# Patient Record
Sex: MALE | Race: WHITE | NOT HISPANIC OR LATINO | Employment: OTHER | URBAN - METROPOLITAN AREA
[De-identification: names, ages, dates, MRNs, and addresses within clinical notes are randomized per-mention and may not be internally consistent; named-entity substitution may affect disease eponyms.]

---

## 2018-06-20 DIAGNOSIS — H90.3 SENSORINEURAL HEARING LOSS, BILATERAL: Primary | ICD-10-CM

## 2018-06-28 ENCOUNTER — MEDICAL CORRESPONDENCE (OUTPATIENT)
Dept: HEALTH INFORMATION MANAGEMENT | Facility: CLINIC | Age: 83
End: 2018-06-28

## 2018-07-02 ENCOUNTER — OFFICE VISIT (OUTPATIENT)
Dept: AUDIOLOGY | Facility: CLINIC | Age: 83
End: 2018-07-02
Payer: COMMERCIAL

## 2018-07-02 DIAGNOSIS — H90.3 SENSORINEURAL HEARING LOSS, BILATERAL: ICD-10-CM

## 2018-07-02 NOTE — PROGRESS NOTES
AUDIOLOGY REPORT    BACKGROUND INFORMATION: Dr. Marnie Valentine implanted Mono Del Valle with a right Nucleus Freedom cochlear implant on 3/19/12 due to severe to profound hearing loss in the right ear and profound hearing loss in the left ear.  The loss is primarily sensorineural but may be mixed in at least one ear, likely due to otosclerosis.  The patient is being seen for discussion of cochlear implant upgrade on 7/2/18 in Audiology at the [facility] [place].  Today's appointment was ordered by Dr. Valentine.      TEST RESULTS AND PROCEDURES: The patient's N5 processors will be obsolete in 2019 so he would like to consider upgrades.  We discussed the Kanso versus the N7 and the patient would like to proceed with N7 upgrade.  The order form was completed.  He would like a sand processor with a remote and a mini shania 2+.  He does not have a cell phone.      His current N5 processors were checked.  The microphone filters were changed and the patient reported an immediate improvement in clarity.  A listening check was normal on both processors.      SUMMARY AND RECOMMENDATIONS: Mr. Del Valle will begin the process to upgrade to N7 processor.  The order form, letter of medical necessity and insurance information were sent to [employer] today.  Patient will return in about 8 weeks for fitting and about 4-6 weeks after the fitting for follow up and testing.  The patient expressed understanding and agreement with this plan.      Abdullahi Quintanilla, CCC-A  Licensed Audiologist  [place] #[id number]

## 2018-07-02 NOTE — MR AVS SNAPSHOT
After Visit Summary   7/2/2018    Mono Del Valle    MRN: [id number]           Patient Information     Date Of Birth          1935        Visit Information        Provider Department      7/2/2018 8:00 AM Marycruz Roberts AuD M [facility]        Today's Diagnoses     Sensorineural hearing loss, bilateral           Follow-ups after your visit        Your next 10 appointments already scheduled     Aug 27, 2018  8:00 AM CDT   Cochlear Implant Brief with Miriam Carrillo [facility] ([facility])    [address] [place] [address] [phone]            Sep 24, 2018  8:00 AM CDT   Cochlear Implant Brief with Miriam Carrillo [facility] ([facility])    [address] [place] [address] [phone]              Who to contact     Please call your clinic at [phone] to:    Ask questions about your health    Make or cancel appointments    Discuss your medicines    Learn about your test results    Speak to your doctor            Additional Information About Your Visit        MyChart Information     [email or web address] gives you secure access to your electronic health record. If you see a primary care provider, you can also send messages to your care team and make appointments. If you have questions, please call your primary care clinic.  If you do not have a primary care provider, please call [phone] and they will assist you.      [employer] is an electronic gateway that provides easy, online access to your medical records. With [email or web address], you can request a clinic appointment, read your test results, renew a prescription or communicate with your care team.     To access your existing account, please contact your [facility] or call [phone] for assistance.        Care EveryWhere ID     This is your Care EveryWhere ID. This could be used by other  organizations to access your [facility] medical records  [id number]         Blood Pressure from Last 3 Encounters:   02/11/15 145/85   12/05/12 133/84   11/14/12 117/73    Weight from Last 3 Encounters:   02/09/15 66.7 kg (147 lb)   12/05/12 67.1 kg (148 lb)   03/19/12 66.8 kg (147 lb 4.3 oz)              We Performed the Following     AUDIOLOGY ADULT REFERRAL        Primary Care Provider Office Phone # Fax #    Edgardo Das [phone] [address] [place] [address]        Equal Access to Services     [facility]: Hadii aad anthony hadasho Somarcello, waaxda luqadaha, qaybta kaalmada adeemmayada, kaye carballo. So [facility] [phone].    ATENCIÓN: Si habla español, tiene a jimenez disposición servicios gratuitos de asistencia lingüística. [facility] [phone].    We comply with applicable federal civil rights laws and [place] laws. We do not discriminate on the basis of race, color, national origin, age, disability, sex, sexual orientation, or gender identity.            Thank you!     Thank you for choosing [facility] AUDIOLOGY  for your care. Our goal is always to provide you with excellent care. Hearing back from our patients is one way we can continue to improve our services. Please take a few minutes to complete the written survey that you may receive in the mail after your visit with us. Thank you!             Your Updated Medication List - Protect others around you: Learn how to safely use, store and throw away your medicines at www.disposemymeds.org.          This list is accurate as of 7/2/18  8:15 AM.  Always use your most recent med list.                   Brand Name Dispense Instructions for use Diagnosis    BENADRYL PO      Take 25 mg by mouth At Bedtime        calcium carbonate 500 MG chewable tablet    TUMS     Take 2 chew tab by mouth as needed for heartburn        CENTRUM PO      Take 1 tablet by mouth daily.        docusate sodium 100 MG tablet     COLACE    30 tablet    Take 100 mg by mouth daily    BPH (benign prostatic hypertrophy) with urinary obstruction       FINASTERIDE PO      Take 5 mg by mouth daily        HYDROcodone-acetaminophen 5-325 MG per tablet    NORCO    30 tablet    Take 1 tablet by mouth every 6 hours as needed for moderate to severe pain    BPH (benign prostatic hypertrophy) with urinary obstruction       loratadine 10 MG tablet    CLARITIN     Take 10 mg by mouth daily        simvastatin 20 MG tablet    ZOCOR     Take 20 mg by mouth At Bedtime.        TAMSULOSIN HCL PO      Take 0.4 mg by mouth.

## 2018-08-15 ENCOUNTER — OFFICE VISIT (OUTPATIENT)
Dept: AUDIOLOGY | Facility: CLINIC | Age: 83
End: 2018-08-15
Payer: COMMERCIAL

## 2018-08-15 DIAGNOSIS — H90.6 MIXED HEARING LOSS, BILATERAL: Primary | ICD-10-CM

## 2018-08-15 NOTE — PROGRESS NOTES
AUDIOLOGY REPORT    BACKGROUND INFORMATION: Dr. Marnie Valentine implanted Mono Del Valle with a right Nucleus Freedom cochlear implant on 3/19/12 due to severe to profound hearing loss in the right ear and profound hearing loss in the left ear.  The loss is mixed in at least one ear, likely due to otosclerosis.  The patient is being seen for equipment upgrade and reprogramming of his right cochlear implant on 8/15/2018 in Audiology at the [facility] [place].  He wears a left Resound hearing aid.        PATIENT REPORT: The patient reports he is very stressed and anxious today as his wife is in the hospital following surgery for an infection.      Patient arrives with his N7 upgrade kit.  He reports he does not have a cell phone so he plans to use the [employer] remote.      FITTING SESSION: Dr. Marnie Valentine, cochlear implant surgeon, ordered today's appointment. The patient came to the clinic for adjustment to the programs in the external speech processor and for assessment of the external components of the cochlear implant system. These components provide power and data to the internal device. Sound is only heard once the external portion is activated. Postoperative treatment, including device fitting and adjustment, audiologic assessments, and training are required at regular intervals. Testing included electropsychophysical measures of threshold, comfort, and loudness balancing was completed to update the program.    Processor type: N7 fit today, N5 processors will become backups  Magnet strength: 1 - No skin irritation or discomfort.  Retention feels tighter than necessary so will order a #1/2 magnet to try at the follow up visit.     TEST RESULTS:   Electrode Impedances: Within normal limits. Stable except for slight decrease on electrodes 20-19.  Will monitor.  Neural Response Testing: not performed  Facial Stimulation: Absent  Tinnitus: Absent  Balance Problems:  Absent  Pain/Discomfort: None reported today  Strategies Tried:  Hz  Strategy Preference:  Hz    Programs in N7 processor:    1. 27 Home: ADRO + Autosensitivity  2. 27 Cafe: Fixed directional, Autosensitivity + ADRO   3. 27 Groups: Adaptive directional, ADRO + Autosensitivity  4. 27 SCAN  All programs have wind and noise reduction active.     Programs in both N5 processors (did not change today):    1. 26 Everyday: ADRO + Autosensitivity  2. 26 Noise: Zoom +Autosensitivity + ADRO   3. 26 Focus: Beam + ADRO + Autosensitivity  4. 26 Music: ADRO + Whisper    Number of Channels per Program: 21 - Electrode 1 is off for quality    The N5 program #26 was converted for use in the N7.  Upon going live, volume was comfortable.  Wind and noise reduction were added.  The program was saved as #27.  A SCAN program was also created for the N7 processor.      All of the equipment in the N7 processor kit was reviewed including the  remote and the mini shania 2+.  The mini shania 2+ was paired to both his right CI and his left Resound hearing aid.  Use was demonstrated.  The patient was given time to practice use of the devices.  The patient's questions were answered.  Warranties were reviewed.    SUMMARY AND RECOMMENDATIONS: Mr. Del Valle was seen for cochlear implant equipment upgrade and programming for his right cochlear implant today.  He will return in 4-6 weeks for follow up and speech perception testing.  We will also recheck magnet strength (may try #1/2) and impedances that day.  Patient will contact the clinic with questions in the meantime.  The patient expressed understanding and agreement with this plan.      Abdullahi Quintanilla, CCC-A  Licensed Audiologist  [place] #[id number]

## 2018-09-24 ENCOUNTER — OFFICE VISIT (OUTPATIENT)
Dept: AUDIOLOGY | Facility: CLINIC | Age: 83
End: 2018-09-24
Payer: COMMERCIAL

## 2018-09-24 DIAGNOSIS — H90.6 MIXED HEARING LOSS, BILATERAL: Primary | ICD-10-CM

## 2018-09-24 NOTE — PROGRESS NOTES
AUDIOLOGY REPORT    METHOD: Speech perception testing is conducted at regular intervals to determine the degree of benefit the patient is obtaining from the cochlear implant (CI). Tests are conducted using the cochlear implant without the benefit of lipreading. All tests are conducted in a sound-treated room. Perception of monosyllabic words and words in sentences are tested. Results usually show improvement over time. A decrease in performance indicates the need for re-programming of the prosthesis and/or replacement of components.     INTERVAL: 6 1/2 years    BACKGROUND INFORMATION: Dr. Marnie Valentine implanted Mono Del Valle with a right Nucleus Freedom cochlear implant on 3/19/12 due to severe to profound hearing loss in the right ear and profound hearing loss in the left ear.  The loss is mixed in at least one ear, likely due to otosclerosis.  The patient is being seen in Audiology at the [facility] on 9/24/18 for follow up and testing after sound processor upgrade 1 month ago.  He wears a left Resound hearing aid (not a model compatible with N7).       Today's appointment was ordered by Dr. Valentine.     PATIENT REPORT: Mono reports that he is very satisfied with the new N7 equipment and that he hears better than with his old processor.  He has some general complaints about sound quality as he always has in the past with the old equipment.  No equipment issues or questions.      TEST RESULTS:    Unaided Thresholds: No response (profound hearing loss) in right ear in September 2012 so not retested today.  Did not test left ear as patient has this monitored at his hearing aid clinic.        Tympanograms are not be completed for Mr. Del Valle due to his history of stapes procedures.      40 minutes were spent assessing the patient s auditory rehabilitation status.  Today s evaluation was ordered by Dr. Valentine.    Device(s) used for Testing:   Right ear: N7 processor.  Changed  from #1 to 1/2 magnet today.  No irritation of skin but retention felt stronger than necessary with #1.  Program 1, Volume 6   Left ear: Resound behind-the-ear hearing    Soundfield Thresholds: Mono was detecting sound in a normal to mild loss range with his CI.  Stable.      CNC Words Test:  The patient repeats 25 single syllable words, auditory only. The words are presented at 60 dB A (conversational level) delivered from a CD player.    Preoperative Performance:  Left ear aided: 0%  Right ear aided: 4%  Bilaterally aided: 16%    3 months Post-Activation of CI:   Right cochlear implant: 36%  Bimodal: 36%    6 months Post-Activation of CI:   Right cochlear implant: 20%  Bimodal: 40%    1 year Post-Activation of CI:  Right cochlear implant: 52%  Bimodal: 64%    2 year Post-Activation of CI:  Right cochlear implant old program: 36%  Right cochlear implant newer February 2014 program: 44%  Bimodal: Did not test today    2 1/2 years Post-Activation of CI:  Right cochlear implant: 56%  Bimodal: 44% - may be affected by inconsistent use of hearing aid.      3 years Post-Activation of CI:  Right cochlear implant: 52%  Bimodal: Did not test at patient request - fatigue    4 years Post-Activation of CI:  Right cochlear implant: Processor 1 - 52%; Processor 2 - 44%  Bimodal: Did not test at patient request - fatigue    6 1/2 years Post-Activation of CI (today):  Right cochlear implant: 32%  Bimodal: 68%    [employer] Sentences Test:  The patient repeats 20 sentences, auditory only.  The sentences are presented at 60 dB A (conversational level) delivered from a CD player.     Preoperative Performance:  Left ear aided: 3%  Right ear aided: 8%  Bilaterally aided: 19%    3 months Post-Activation of CI:   Right cochlear implant: 40%  Bimodal: 48%    6 months Post-Activation of CI:   Right cochlear implant: 53%  Bimodal: 77%    1 year Post-Activation of CI:  Right cochlear implant: 64%  Bimodal: 70%     2 year Post-Activation of  CI:  Right cochlear implant old program: 61%  Right cochlear implant newer February 2014 program: 53%  Bimodal: Did not test today    2 1/2 years Post-Activation of CI:  Right cochlear implant: 64%  Bimodal: 71%    3 years Post-Activation of CI:  Right cochlear implant: 60%, Did not test in noise at patient request - fatigue  Bimodal: Did not test at patient request - fatigue    4 years Post-Activation of CI:  Right cochlear implant: Processors 1 - 60%, Processor 2 - 47%; Did not test in noise at patient request - fatigue  Bimodal: Did not test at patient request - fatigue    6 1/2 years Post-Activation of CI (today):  Right cochlear implant: 42%  Bimodal: 53%    It is unclear whether today's test results were poorer due to his current level of stress which has affected his performance in the past, but programming changes were made.      FITTING SESSION: Dr. Marnie Valentine, cochlear implant surgeon, ordered today's appointment. The patient came to the clinic for adjustment to the programs in the external speech processor and for assessment of the external components of the cochlear implant system. These components provide power and data to the internal device. Sound is only heard once the external portion is activated. Postoperative treatment, including device fitting and adjustment, audiologic assessments, and training are required at regular intervals. Testing included electropsychophysical measures of threshold, comfort, and loudness balancing was completed to update the program.    Processor type: N7; N5 backups - patient did not bring backup equipment today  Magnet strength: Reduced from #1 to #1/2 in N7 today,   (#1 was returned to Cochlear today)    TEST RESULTS:   Electrode Impedances: Within normal limits and stable.  Neural Response Testing: not performed  Facial Stimulation: Absent  Tinnitus: Absent  Balance Problems: Absent  Pain/Discomfort: None reported today  Strategies Tried:  Hz  Strategy  Preference:  Hz    Programs in N7 processor:    1. 27 Home: ADRO + Autosensitivity  2. 28 NEW: Home: ADRO + Autosensitivity  3. 27 Groups: Adaptive directional, ADRO + Autosensitivity  4. 27 SCAN  All programs have wind and noise reduction active.     Programs in both N5 processors (did not change today - patient did not bring along to appointment):    1. 26 Everyday: ADRO + Autosensitivity  2. 26 Noise: Zoom +Autosensitivity + ADRO   3. 26 Focus: Beam + ADRO + Autosensitivity  4. 26 Music: ADRO + Whisper    Number of Channels per Program: 21 - Electrode 1 is off for quality    COMMENTS: Threshold (T) levels were measured and only slightly changes were needed.  Comfort (C) levels changed more noticeably, especially in the high frequencies where they increased significantly.  New program was saved as map #28.  Patient tolerated the changes well and felt the sound quality was improved but would like to compare this to his old program (#27) and then return to clinic.      SUMMARY AND RECOMMENDATIONS: Mr. Del Valle returns for 6 1/2 year testing with his cochlear implant after upgrading to N7 processor one month ago.  Magnet strength was reduced from #1 to #1/2 today in the N7 processor.  Speech perception scores with the CI alone were slightly lower than last testing from 2 years ago but the patient acknowledged he is under stress currently due to his wife's health.  Stress has affected his test results in the past.  Will monitor.  There was a bimodal advantage.  Programming changes were made and patient will compare the new program to his old one to determine preferences.  He will return in 6 weeks for follow up.  If he prefers the new program, the Cafe program will be added back in and the Groups and SCAN programs will be updated based on the new program.  Additionally, his backup processors can be updated if he brings those along.  If he does choose the new program, speech perception testing will also be repeated  to see if there are changes with the new map.      He will also continue to follow up with Dr. Valentine for any medical concerns related to his ears or cochlear implants.      The patient expressed understanding and agreement with this plan.    Abdullahi Quintanilla, CCC-A  Licensed Audiologist  [place] #[id number]      Enclosure: audiogram

## 2018-09-24 NOTE — MR AVS SNAPSHOT
After Visit Summary   9/24/2018    Mono Del Valle    MRN: [id number]           Patient Information     Date Of Birth          1935        Visit Information        Provider Department      9/24/2018 8:00 AM Marycruz Roberts AuD M [facility]         Follow-ups after your visit        Your next 10 appointments already scheduled     Nov 14, 2018  8:30 AM CST   Cochlear Implant Brief with Miriam Carrillo [facility] ([facility])    [address] [place] [address] [phone]              Who to contact     Please call your clinic at [phone] to:    Ask questions about your health    Make or cancel appointments    Discuss your medicines    Learn about your test results    Speak to your doctor            Additional Information About Your Visit        [email or web address] Information     [employer] gives you secure access to your electronic health record. If you see a primary care provider, you can also send messages to your care team and make appointments. If you have questions, please call your primary care clinic.  If you do not have a primary care provider, please call [phone] and they will assist you.      [employer] is an electronic gateway that provides easy, online access to your medical records. With [employer], you can request a clinic appointment, read your test results, renew a prescription or communicate with your care team.     To access your existing account, please contact your [facility] or call [phone] for assistance.        Care EveryWhere ID     This is your Care EveryWhere ID. This could be used by other organizations to access your [facility] medical records  [id number]         Blood Pressure from Last 3 Encounters:   02/11/15 145/85   12/05/12 133/84   11/14/12 117/73    Weight from Last 3 Encounters:   02/09/15 66.7 kg (147 lb)   12/05/12 67.1 kg (148 lb)   03/19/12 66.8 kg (147 lb 4.3 oz)               We Performed the Following     AUDIOGRAM/TYMPANOGRAM - INTERFACE        Primary Care Provider Office Phone # Fax #    Edgardo Das [phone] [address]  [facility] [address]        Equal Access to Services     [facility] : Hadjon pritchard ku jersono Somirnaali, waaxda luqadaha, qaybta kaalmada adeemmayada, kaye solisn yifan preston laLorenanataly carballo. So [facility] [phone].    ATENCIÓN: Si habla español, tiene a jimenez disposición servicios gratuitos de asistencia lingüística. Llame al [phone].    We comply with applicable federal civil rights laws and [place] laws. We do not discriminate on the basis of race, color, national origin, age, disability, sex, sexual orientation, or gender identity.            Thank you!     Thank you for choosing [facility] AUDIOLOGY  for your care. Our goal is always to provide you with excellent care. Hearing back from our patients is one way we can continue to improve our services. Please take a few minutes to complete the written survey that you may receive in the mail after your visit with us. Thank you!             Your Updated Medication List - Protect others around you: Learn how to safely use, store and throw away your medicines at www.disposemymeds.org.          This list is accurate as of 9/24/18  9:03 AM.  Always use your most recent med list.                   Brand Name Dispense Instructions for use Diagnosis    BENADRYL PO      Take 25 mg by mouth At Bedtime        calcium carbonate 500 MG chewable tablet    TUMS     Take 2 chew tab by mouth as needed for heartburn        CENTRUM PO      Take 1 tablet by mouth daily.        docusate sodium 100 MG tablet    COLACE    30 tablet    Take 100 mg by mouth daily    BPH (benign prostatic hypertrophy) with urinary obstruction       FINASTERIDE PO      Take 5 mg by mouth daily        HYDROcodone-acetaminophen 5-325 MG per tablet    NORCO    30 tablet    Take 1 tablet by mouth every 6 hours as  needed for moderate to severe pain    BPH (benign prostatic hypertrophy) with urinary obstruction       loratadine 10 MG tablet    CLARITIN     Take 10 mg by mouth daily        simvastatin 20 MG tablet    ZOCOR     Take 20 mg by mouth At Bedtime.        TAMSULOSIN HCL PO      Take 0.4 mg by mouth.

## 2018-11-14 ENCOUNTER — OFFICE VISIT (OUTPATIENT)
Dept: AUDIOLOGY | Facility: CLINIC | Age: 83
End: 2018-11-14
Payer: COMMERCIAL

## 2018-11-14 DIAGNOSIS — H90.6 MIXED HEARING LOSS, BILATERAL: Primary | ICD-10-CM

## 2018-11-14 NOTE — MR AVS SNAPSHOT
After Visit Summary   11/14/2018    Mono Del Valle    MRN: [id number]           Patient Information     Date Of Birth          1935        Visit Information        Provider Department      11/14/2018 8:30 AM Marycruz Roberts, [facility]        Today's Diagnoses     Mixed hearing loss, bilateral    -  1       Follow-ups after your visit        Follow-up notes from your care team     Return in about 1 year (around 11/14/2019).      Who to contact     Please call your clinic at [phone] to:    Ask questions about your health    Make or cancel appointments    Discuss your medicines    Learn about your test results    Speak to your doctor            Additional Information About Your Visit        [employer] Information     SocialDial gives you secure access to your electronic health record. If you see a primary care provider, you can also send messages to your care team and make appointments. If you have questions, please call your primary care clinic.  If you do not have a primary care provider, please call [phone] and they will assist you.      [employer] is an electronic gateway that provides easy, online access to your medical records. With [employer], you can request a clinic appointment, read your test results, renew a prescription or communicate with your care team.     To access your existing account, please contact your [facility] or call [phone] for assistance.        Care EveryWhere ID     This is your Care EveryWhere ID. This could be used by other organizations to access your [place] medical records  [id number]         Blood Pressure from Last 3 Encounters:   02/11/15 145/85   12/05/12 133/84   11/14/12 117/73    Weight from Last 3 Encounters:   02/09/15 66.7 kg (147 lb)   12/05/12 67.1 kg (148 lb)   03/19/12 66.8 kg (147 lb 4.3 oz)              We Performed the Following     AUDIOGRAM/TYMPANOGRAM - INTERFACE     Eval of Aud Rehab Status (60 min)    (55585)     RT: Diagnostic Analysis of CI 7 yrs & over, Subsequent Programming   (66657)        Primary Care Provider Office Phone # Fax #    Edgardo Das [phone] [address] [place] [address]        Equal Access to Services     TAWNYA CANO : Hadii aad ku hadasho Soomaali, waaxda luqadaha, qaybta kaalmada adeegyada, waxay idiin hayaan adeemma preston laparkerjulieta ah. So [facility] [phone].    ATENCIÓN: Si habla español, tiene a jimenez disposición servicios gratuitos de asistencia lingüística. Sahra al [phone].    We comply with applicable federal civil rights laws and [place] laws. We do not discriminate on the basis of race, color, national origin, age, disability, sex, sexual orientation, or gender identity.            Thank you!     Thank you for choosing [facility] AUDIOLOGY  for your care. Our goal is always to provide you with excellent care. Hearing back from our patients is one way we can continue to improve our services. Please take a few minutes to complete the written survey that you may receive in the mail after your visit with us. Thank you!             Your Updated Medication List - Protect others around you: Learn how to safely use, store and throw away your medicines at www.disposemymeds.org.          This list is accurate as of 11/14/18  9:16 AM.  Always use your most recent med list.                   Brand Name Dispense Instructions for use Diagnosis    BENADRYL PO      Take 25 mg by mouth At Bedtime        calcium carbonate 500 MG chewable tablet    TUMS     Take 2 chew tab by mouth as needed for heartburn        CENTRUM PO      Take 1 tablet by mouth daily.        docusate sodium 100 MG tablet    COLACE    30 tablet    Take 100 mg by mouth daily    BPH (benign prostatic hypertrophy) with urinary obstruction       FINASTERIDE PO      Take 5 mg by mouth daily        HYDROcodone-acetaminophen 5-325 MG per tablet    NORCO    30 tablet    Take 1 tablet by mouth  every 6 hours as needed for moderate to severe pain    BPH (benign prostatic hypertrophy) with urinary obstruction       loratadine 10 MG tablet    CLARITIN     Take 10 mg by mouth daily        simvastatin 20 MG tablet    ZOCOR     Take 20 mg by mouth At Bedtime.        TAMSULOSIN HCL PO      Take 0.4 mg by mouth.

## 2018-11-14 NOTE — PROGRESS NOTES
AUDIOLOGY REPORT      BACKGROUND INFORMATION: Dr. Marnie Valentine implanted Mono Del Valle with a right Nucleus Freedom cochlear implant on 3/19/12 due to severe to profound hearing loss in the right ear and profound hearing loss in the left ear.  The loss is mixed in at least one ear, likely due to otosclerosis.  The patient is being seen in Audiology at the [facility] on 11/14/18 for follow up and testing.  He was last seen on 9/24/18.  At that time, speech perception scores had decreased so programming changes were made.  The patient compared the old and new programs and is back today to determine preferences and retest.  He wears a left Resound hearing aid (not a model compatible with N7).       Today's appointment was ordered by Dr. Valentine.     PATIENT REPORT: Mono reports that he immediately preferred the new program made on 9/24/18 compared to his old program.  He would like the high frequencies decreased very slightly, but overall he feels he is hearing much better.  After adjustments, he feels ready for repeat barajas testing.      FITTING SESSION: Dr. Marnie Valentine, cochlear implant surgeon, ordered today's appointment. The patient came to the clinic for adjustment to the programs in the external speech processor and for assessment of the external components of the cochlear implant system. These components provide power and data to the internal device. Sound is only heard once the external portion is activated. Postoperative treatment, including device fitting and adjustment, audiologic assessments, and training are required at regular intervals. Testing included electropsychophysical measures of threshold, comfort, and loudness balancing was completed to update the program.    Processor type: N7; N5 backups - patient did not bring backup equipment today  Magnet strength: 1/2 in N7, #1 in N5 - will monitor (patient has not brought N5 in for updates)     TEST RESULTS:    Dataloggin.3 hours of use per day  Electrode Impedances: Within normal limits and stable.  Neural Response Testing: not performed  Facial Stimulation: Absent  Tinnitus: Absent  Balance Problems: Absent  Pain/Discomfort: None reported today  Strategies Tried:  Hz  Strategy Preference:  Hz    Programs in N7 processor:    1. 29 Home: ADRO + Autosensitivity  2. 29 Cafe: Fixed directional, ADRO + Autosensitivity  3. 29 Groups: Adaptive directional, ADRO + Autosensitivity  4. 29 SCAN  All programs have wind and noise reduction active.     Programs in both N5 processors (did not change today - patient did not bring along to appointment):    1. 26 Everyday: ADRO + Autosensitivity  2. 26 Noise: Zoom +Autosensitivity + ADRO   3. 26 Focus: Beam + ADRO + Autosensitivity  4. 26 Music: ADRO + Whisper    Number of Channels per Program: 21 - Electrode 1 is off for quality    COMMENTS: Comfort (C) levels were decreased 5 units on electrodes 5 to 2.  Patient feel this significantly improved sound quality/comfort.  All of the N7 programs were updated accordingly.  The old program was removed from the processor and the Cafe program was put back in.      After programming, testing was repeated to see if the patient's scores had improved following mapping, compared to 18 results when scores decreased.        METHOD: Speech perception testing is conducted at regular intervals to determine the degree of benefit the patient is obtaining from the cochlear implant (CI). Tests are conducted using the cochlear implant without the benefit of lipreading. All tests are conducted in a sound-treated room. Perception of monosyllabic words and words in sentences are tested. Results usually show improvement over time. A decrease in performance indicates the need for re-programming of the prosthesis and/or replacement of components.     INTERVAL: 6 1/2+ years    TEST RESULTS:    Unaided Thresholds: No response (profound hearing  loss) in right ear in September 2012 so not retested today.  Did not test left ear as patient has this monitored at his hearing aid clinic.        Tympanograms are not be completed for Mr. Del Valle due to his history of stapes procedures.      35 minutes were spent assessing the patient s auditory rehabilitation status.  Today s evaluation was ordered by Dr. Valentine.    Device(s) used for Testing:   Right ear: N7 processor.  Changed from #1/2 magnet without irritation or discomfort.  Program 1, Volume 6   Left ear: Resound behind-the-ear hearing - did not complete bimodal testing today     Soundfield Thresholds: Mono was detecting sound in a normal to borderline normal range with his CI.  Stable.      CNC Words Test:  The patient repeats 25 single syllable words, auditory only. The words are presented at 60 dB A (conversational level) delivered from a CD player.    Preoperative Performance:  Left ear aided: 0%  Right ear aided: 4%  Bilaterally aided: 16%    CI Performance:  3 months: 36%  6 months: 20%  1 year: 52%  2 years: 36% old program, 44% new program  2 1/2 years: 56%  3 years: 52%  4 years: 52% processor 1, 44% processor 2  6 1/2 years (last visit on 9/24/18): 32%  6 1/2+ years (today): 52% - significant improvement re: 9/24/18 scores    Bimodal Performance:   3 months: 36%  6 months: 40%  1 year: 64%  2 years: Did not test (DNT)  2 1/2 years: 44% (may be affected by inconsistent use of hearing aid)   3 years: DNT at patient request - fatigue  4 years: DNT at patient request - fatigue  6 1/2 years (last visit on 9/24/18): 68%  6 1/2+ years (today): Did not retest since 9/24/18 scores were good and patient fatigued today       [employer]Bio Sentences Test:  The patient repeats 20 sentences, auditory only.  The sentences are presented at 60 dB A (conversational level) delivered from a CD player.     Preoperative Performance:  Left ear aided: 3%  Right ear aided: 8%  Bilaterally aided: 19%    CI Performance:   3 months:  40%  6 months: 53%  1 year: 64%  2 years: 61% old program, 53% new program  2 1/2 years: 64%  3 years: 60%  4 years: 60% processor 1, 47% processor 2  6 1/2 years (last visit on 9/24/18): 42%  6 1/2+ years (today): 58% - improvement re: 9/24/18 scores    Bimodal Performance:  3 months: 48%  6 months: 77%  1 year: 70%  2 years: DNT   2 1/2 years: 71%  3 years: DNT at patient request - fatigue  4 years: DNT at patient request - fatigue  6 1/2 years (last visit on 9/24/18): 53%  6 1/2+ years (today): DNT    SUMMARY AND RECOMMENDATIONS: Mr. Del Valle returns for cochlear implant programming and repeat speech perception testing after recent programming changes (due to decrease in scores on 9/24/18).  Patient adjusted well to new programs and slight changes were made today to decrease high frequency stimulation and to add the Cafe program back into the N7 processor.  Audibility was in a normal/borderline normal range with the new program.  Speech perception scores were improved with the new program today, compared to scores on 9/24/18, and are back to baseline levels.  This may be related to programming and/or his stress level, which was better today as his wife's health has been improving.      Patient will return to the clinic in 1 year for repeat testing or sooner as needed for programming.  He will bring his backup N5 processors to his next visit so the programs and magnet strength can be updated to be the same as his N7 processor.  He can make a separate visit sooner if he would like this done before his annual check.  He reports he will likely wait since he would rarely use the N5 processors (only for about 1 day if the N7 stops working and he is waiting for replacement equipment).      He will also continue to follow up with Dr. Valentine for any medical concerns related to his ears or cochlear implants.      The patient expressed understanding and agreement with this plan.    Abdullahi Quintanilla, CCC-A  Licensed  Audiologist  [place] #[id number]      Enclosure: audiogram

## 2019-11-09 ENCOUNTER — HEALTH MAINTENANCE LETTER (OUTPATIENT)
Age: 84
End: 2019-11-09

## 2020-02-23 ENCOUNTER — HEALTH MAINTENANCE LETTER (OUTPATIENT)
Age: 85
End: 2020-02-23

## 2020-04-09 ENCOUNTER — TELEPHONE (OUTPATIENT)
Dept: AUDIOLOGY | Facility: CLINIC | Age: 85
End: 2020-04-09

## 2020-04-09 NOTE — TELEPHONE ENCOUNTER
Patient notified via email that due to COVID-19 protocol that his 4/30/2020 appt is being rescheduled to 5/14/2020 at 7am.      Abdullahi Quintanilla, CCC-A, [facility]  Licensed Audiologist  [place] #[id number]

## 2020-04-13 ENCOUNTER — TELEPHONE (OUTPATIENT)
Dept: AUDIOLOGY | Facility: CLINIC | Age: 85
End: 2020-04-13

## 2020-04-13 NOTE — TELEPHONE ENCOUNTER
Patient notified that due to COVID-19 protocol that 5/14/2020 appt is being rescheduled to 6/18/2020 at 7am.  Patient advised to call [phone] to reschedule if the new appt does not work for patient.      Abdullahi Quintanilla, CCC-A, [facility]  Licensed Audiologist  [place] #[id number]

## 2020-07-22 NOTE — PROGRESS NOTES
AUDIOLOGY REPORT      BACKGROUND INFORMATION: Dr. Marnie Valentine implanted Mono Del Valle with a right Nucleus Freedom cochlear implant on 3/19/12 due to severe to profound hearing loss in the right ear and profound hearing loss in the left ear.  The loss may be mixed in at least one ear (primarily sensorineural), likely due to otosclerosis.  The patient is being seen in Audiology at the [facility] on 8/3/2020 for follow up and testing.  He wears a left Resound hearing aid (not a model compatible with N7).       Today's appointment was ordered by Dr. Valentine.     PATIENT REPORT: Patient denies any current issues with his cochlear implant.  He is ready for annual testing.      METHOD: Speech perception testing is conducted at regular intervals to determine the degree of benefit the patient is obtaining from the cochlear implant (CI). Tests are conducted using the cochlear implant without the benefit of lipreading. All tests are conducted in a sound-treated room. Perception of monosyllabic words and words in sentences are tested. Results usually show improvement over time. A decrease in performance indicates the need for re-programming of the prosthesis and/or replacement of components.     INTERVAL: 8 1/2+ years    TEST RESULTS:    Unaided Thresholds: No response (profound hearing loss) in right ear in September 2012 so not retested today.  Did not test left ear as patient has this monitored at his hearing aid clinic.        Tympanograms are not be completed for Mr. Del Valle due to his history of stapes procedures.      40 minutes were spent assessing the patient s auditory rehabilitation status.  Today s evaluation was ordered by Dr. Valentine.    Device(s) used for Testing:   Right ear: N7 processor.  #1/2 magnet without irritation or discomfort.  Retention is appropriate.  Program 1, Volume 7   Left ear: Resound behind-the-ear hearing     Soundfield Thresholds: Mono was detecting sound in a  normal to borderline normal range with his CI.  Stable.      CNC Words Test:  The patient repeats 25 single syllable words, auditory only. The words are presented at 60 dB A (conversational level) delivered from a CD player.    Preoperative Performance:  Left ear aided: 0%  Right ear aided: 4%  Bilaterally aided: 16%    CI Performance:  3 months: 36%  6 months: 20%  1 year: 52%  2 years: 36% old program, 44% new program  2 1/2 years: 56%  3 years: 52%  4 years: 52% processor 1, 44% processor 2  6 1/2 years (last visit on 18): 32%  6 1/2+ years: 52% - significant improvement re: 18 scores  8 1/2+ years (today): 52%     Bimodal Performance:   3 months: 36%  6 months: 40%  1 year: 64%  2 years: Did not test (DNT)  2 1/2 years: 44% (may be affected by inconsistent use of hearing aid)   3 years: DNT at patient request - fatigue  4 years: DNT at patient request - fatigue  6 1/2 years (last visit on 18): 68%  6 1/2+ years: Did not retest since 18 scores were good and patient fatigued today   8 1/2+ years (today): 72%      AzBio Sentences Test:  The patient repeats 20 sentences, auditory only.  The sentences are presented at 60 dB A (conversational level) delivered from a CD player.     Preoperative Performance:  Left ear aided: 3%  Right ear aided: 8%  Bilaterally aided: 19%    CI Performance:   3 months: 40%  6 months: 53%  1 year: 64%  2 years: 61% old program, 53% new program  2 1/2 years: 64%  3 years: 60%  4 years: 60% processor 1, 47% processor 2  6 1/2 years (last visit on 18): 42%  6 1/2+ years: 58% - improvement re: 18 scores  8 1/2+ years (today): 59%     Bimodal Performance:  3 months: 48%  6 months: 77%  1 year: 70%  2 years: DNT   2 1/2 years: 71%  3 years: DNT at patient request - fatigue  4 years: DNT at patient request - fatigue  6 1/2 years (last visit on 18): 53%  6 1/2+ years: DNT  8 1/2+ years (today): 73%    COMMENTS:   Dataloggin.2 hours of use per  day  Impedances: Normal and stable on all electrodes.  Compliance was maintained.  Since a full reprogramming session was not needed, programming charges were modified.     SUMMARY AND RECOMMENDATIONS: Mr. Del Valle returns for cochlear implant programming and repeat speech perception testing after 8.5 years of use.  Speech perception scores were stable.  There was a bimodal advantage.  Cochlear implant impedances were stable and normal.      Patient will return to the clinic in 1-2 years for repeat testing or sooner as needed for programming.  He will bring his backup N5 processors to his next visit so the programs and magnet strength can be updated to be the same as his N7 processor.  He can make a separate visit sooner if he would like this done before his annual check.  He reports he will likely wait since he would rarely use the N5 processors (only for about 1 day if the N7 stops working and he is waiting for replacement equipment).      He will also continue to follow up with Dr. Valentine for any medical concerns related to his ears or cochlear implants.      The patient expressed understanding and agreement with this plan.    Abdullahi Quintanilla, CCC-A, [facility]  Licensed Audiologist  [place] #[id number]    Enclosure: audiogram

## 2020-07-27 DIAGNOSIS — H90.3 SENSORINEURAL HEARING LOSS, BILATERAL: Primary | ICD-10-CM

## 2020-08-03 ENCOUNTER — OFFICE VISIT (OUTPATIENT)
Dept: AUDIOLOGY | Facility: CLINIC | Age: 85
End: 2020-08-03
Payer: COMMERCIAL

## 2020-08-03 DIAGNOSIS — H90.6 MIXED HEARING LOSS, BILATERAL: Primary | ICD-10-CM

## 2020-08-03 DIAGNOSIS — H90.3 SENSORINEURAL HEARING LOSS, BILATERAL: ICD-10-CM

## 2020-12-06 ENCOUNTER — HEALTH MAINTENANCE LETTER (OUTPATIENT)
Age: 85
End: 2020-12-06

## 2021-04-11 ENCOUNTER — HEALTH MAINTENANCE LETTER (OUTPATIENT)
Age: 86
End: 2021-04-11

## 2021-06-02 ENCOUNTER — TELEPHONE (OUTPATIENT)
Dept: AUDIOLOGY | Facility: CLINIC | Age: 86
End: 2021-06-02

## 2021-06-02 NOTE — TELEPHONE ENCOUNTER
Returned call to patient's wife Emma.  Patient lost  remote.  He is wondering if he should use loss coverage to replace.      We discussed that he could use Nucleus Smart elinor instead of remote.  Patient is not interested in this as he doesn't carry a smart phone.    Patient will contact [employer] and get their advice to see if the remote could be replaced under loss warranty or if they recommend billing to insurance and saving loss in case a processor is lost later.      They will update the clinic if any further support is needed.    Emma expressed understanding and agreement with this plan.      Abdullahi Quintanilla, CCC-A, [facility]  Licensed Audiologist  [place] #[id number]

## 2021-09-26 ENCOUNTER — HEALTH MAINTENANCE LETTER (OUTPATIENT)
Age: 86
End: 2021-09-26

## 2021-11-30 ENCOUNTER — TELEPHONE (OUTPATIENT)
Dept: AUDIOLOGY | Facility: CLINIC | Age: 86
End: 2021-11-30

## 2021-11-30 NOTE — TELEPHONE ENCOUNTER
Returned call to patient's wife Lulú.  Patient lost the clear earhook on his N7 cochlear implant sound processor.  Two earhooks will be mailed to patient's home address so he can put one on and keep the other as a spare.  Lulú expressed understanding and agreement with this plan.      Abdullahi Quintanilla, CCC-A, [facility]  Licensed Audiologist  [place] #[id number]

## 2022-04-12 ENCOUNTER — TELEPHONE (OUTPATIENT)
Dept: AUDIOLOGY | Facility: CLINIC | Age: 87
End: 2022-04-12

## 2022-04-12 NOTE — TELEPHONE ENCOUNTER
Returned call to patient's wife May.  They are wondering where to order rechargeable batteries for Mono's cochlear implant.  Provided May with phone # for [employer].  We discussed that [employer] can be contacted for equipment orders and/or troubleshooting as needed in the future.        Abdullahi Quintanilla, CCC-A, [facility]  Licensed Audiologist  [place] #[id number]

## 2022-05-07 ENCOUNTER — HEALTH MAINTENANCE LETTER (OUTPATIENT)
Age: 87
End: 2022-05-07

## 2022-05-23 ENCOUNTER — TELEPHONE (OUTPATIENT)
Dept: AUDIOLOGY | Facility: CLINIC | Age: 87
End: 2022-05-23

## 2022-05-23 NOTE — TELEPHONE ENCOUNTER
Patient's wife called stating they ordered Mono's cochlear implant batteries from [employer] and are in need of a certificate of medical necessity (CMN) from Dr. Valentine.  I contacted [employer] and requested they send the CMN to the clinic via [employer].  Document was received.  Dr. Valentine signed the document electronically and patient's wife was called and notified this was complete.      Abdullahi Quintanilla, CCC-A, [facility]  Licensed Audiologist  [place] #[id number]

## 2023-04-17 ENCOUNTER — TELEPHONE (OUTPATIENT)
Dept: AUDIOLOGY | Facility: CLINIC | Age: 88
End: 2023-04-17
Payer: COMMERCIAL

## 2023-04-17 NOTE — TELEPHONE ENCOUNTER
LVM for patient regarding scheduling a check up for CI check 90 minutes with Miranda. Provided Clinic number for scheduling needs.

## 2023-04-23 ENCOUNTER — HEALTH MAINTENANCE LETTER (OUTPATIENT)
Age: 88
End: 2023-04-23

## 2023-05-15 ENCOUNTER — TELEPHONE (OUTPATIENT)
Dept: AUDIOLOGY | Facility: CLINIC | Age: 88
End: 2023-05-15
Payer: COMMERCIAL

## 2023-05-15 NOTE — TELEPHONE ENCOUNTER
Returned call to patient's wife May. The magnet came out of Mono's coil for his cochlear implant.  He has been taping the magnet into the coil.  They are unsure if the magnet is broken.    Discussed how to screw the magnet into the coil and how it locks into place.  If that doesn't resolve the issue and if they think the magnet is faulty, they can order a replacement from [employer].  Phone # was provided.     Discussed that patient may be eligible for N8 upgrade after August 2023. His current N7 equipment is out of warranty.       May will contact the clinic if they are unable to resolve the issues on their own and if they feel a clinic visit is needed.  Transportation is currently an issue as the patient can no longer drive to the clinic so they would like to avoid a visit unless necessary.       Abdullahi Quintanilla, CCC-A, [facility]  Licensed Audiologist  [place] #[id number]

## 2023-05-15 NOTE — TELEPHONE ENCOUNTER
Returned call to patient's wife May.  She is trying to order a replacement magnet from [employer] and needs strength.  Advised her that Mono uses 1/2 M magnet.  May will notify [employer].      Abdullahi Quintanilla, CCC-A, [facility]  Licensed Audiologist  [place] #[id number]

## 2023-06-02 ENCOUNTER — HEALTH MAINTENANCE LETTER (OUTPATIENT)
Age: 88
End: 2023-06-02

## 2023-11-27 ENCOUNTER — TELEPHONE (OUTPATIENT)
Dept: AUDIOLOGY | Facility: CLINIC | Age: 88
End: 2023-11-27
Payer: COMMERCIAL

## 2023-11-27 NOTE — TELEPHONE ENCOUNTER
Returned call to patient's wife Lulú.  They are interested in starting process to upgrade to N8 cochlear implant sound processor and would also like to schedule testing with his cochlear implant.     Advised Lulú to contact [employer] to start the upgrade order.  If they request Ready to Wear, it will be programmed at [employer] and shipped directly to the patient ready to use, without a fitting appointment in clinic.    Once order has been placed, Lulú will connect with Mikaela Read, implant coordinator, to set up a follow up CI check 90 min appointment with me.  That should be scheduled 1-2 months after he expects to have the equipment (equipment typically arrives 6-8 weeks after order is placed).    Lulú expressed understanding and agreement with this plan.    Abdullahi Quintanilla, CCC-A, [facility]  Licensed Audiologist  [place] #[id number]

## 2023-12-12 ENCOUNTER — DOCUMENTATION ONLY (OUTPATIENT)
Dept: AUDIOLOGY | Facility: CLINIC | Age: 88
End: 2023-12-12
Payer: COMMERCIAL

## 2023-12-12 ENCOUNTER — TELEPHONE (OUTPATIENT)
Dept: AUDIOLOGY | Facility: CLINIC | Age: 88
End: 2023-12-12
Payer: COMMERCIAL

## 2023-12-12 NOTE — TELEPHONE ENCOUNTER
Spoke to wife Lulú regarding any issues Mono is experiencing with current CI processor to include in LMN for processor upgrade. She states that the battery life is significantly reduced and Mono is having issues hearing, with reduced sound quality. The magnet has also falled out of the headpiece but has been remedied for timebeing. Scheduled a CI 90 for March 2024, explaining that he will get processor programmed from [employer], will have some 'wear time' and come in for changes after 1-2 months. Also noted they can contact clinic if he has any issues when it arrives.    -Mikaela ESTRELLA 12/12/23

## 2023-12-12 NOTE — PROGRESS NOTES
Letter of Medical Necessity written and routed to Dr. Marnie Valentine for signature and submission to [employer] for upgrade request to N8 CI processor.     -Mikaela ESTRELLA 12/12/23

## 2023-12-20 ENCOUNTER — ANCILLARY PROCEDURE (OUTPATIENT)
Dept: CT IMAGING | Facility: CLINIC | Age: 88
End: 2023-12-20
Payer: COMMERCIAL

## 2023-12-20 DIAGNOSIS — Z13.29 SCREENING FOR ENDOCRINE, NUTRITIONAL, METABOLIC AND IMMUNITY DISORDER: ICD-10-CM

## 2023-12-20 DIAGNOSIS — Z13.0 SCREENING FOR ENDOCRINE, NUTRITIONAL, METABOLIC AND IMMUNITY DISORDER: ICD-10-CM

## 2023-12-20 DIAGNOSIS — R41.3 MEMORY LOSS OR IMPAIRMENT: ICD-10-CM

## 2023-12-20 DIAGNOSIS — Z13.21 SCREENING FOR ENDOCRINE, NUTRITIONAL, METABOLIC AND IMMUNITY DISORDER: ICD-10-CM

## 2023-12-20 DIAGNOSIS — Z13.228 SCREENING FOR ENDOCRINE, NUTRITIONAL, METABOLIC AND IMMUNITY DISORDER: ICD-10-CM

## 2023-12-20 PROCEDURE — 70450 CT HEAD/BRAIN W/O DYE: CPT

## 2024-02-22 DIAGNOSIS — H90.3 SENSORINEURAL HEARING LOSS, BILATERAL: Primary | ICD-10-CM

## 2024-03-11 NOTE — PROGRESS NOTES
AUDIOLOGY REPORT    BACKGROUND INFORMATION: Dr. Marnie Valentine implanted Mono Del Valle with a right Nucleus Freedom cochlear implant on 3/19/12 due to severe to profound hearing loss in the right ear and profound hearing loss in the left ear.  The loss may be mixed in at least one ear (primarily sensorineural), likely due to otosclerosis.  The patient is being seen in Audiology at the [facility] on 3/20/2024 for follow up and testing.  He wears a left Resound hearing aid (not a model compatible with his cochlear implant).       Today's appointment was ordered by Dr. Valentine.     PATIENT REPORT: Patient upgraded to N8 processor (Ready to Wear option) about 2-3 months ago.  It was programmed at [employer] as follows:     Programs in N8 processor:    1. 30 Home: ADRO + Autosensitivity  2. 30 Cafe: Fixed directional, ADRO + Autosensitivity  3. 30 Groups: Adaptive directional, ADRO + Autosensitivity  4. 30 SCAN  All programs have wind and noise reduction active.     He denies any current issues with his cochlear implant and reports volume has been comfortable.  Mono does not use a cell phone, so he declines assistance with Nucleus Smart elinor set up.  He has an old remote from his N7 which he never uses and battery is dead.  He was advised to change this if he'd like to use it with the N8.  He is ready for annual testing.      METHOD: Speech perception testing is conducted at regular intervals to determine the degree of benefit the patient is obtaining from the cochlear implant (CI). Tests are conducted using the cochlear implant without the benefit of lipreading. All tests are conducted in a sound-treated room. Perception of monosyllabic words and words in sentences are tested. Results usually show improvement over time. A decrease in performance indicates the need for re-programming of the prosthesis and/or replacement of components.     INTERVAL: 12 years    TEST RESULTS:       Otoscopy:   LEFT: Hard cerumen almost completely occluding deep in canal.  RIGHT: Small amount of non-occluding cerumen.  Patient will schedule ear cleaning.      Unaided Thresholds: No response (profound hearing loss) in right ear in September 2012 so not retested today.  Did not test left ear as patient has this monitored at his hearing aid clinic and due to cerumen.        Tympanograms are not be completed for Mr. Del Valle due to his history of stapes procedures.      35 minutes were spent assessing the patient s auditory rehabilitation status.  Today s evaluation was ordered by Dr. Valentine.    Device(s) used for Testing:   Right ear: N8 processor.  #1/2 magnet without irritation or discomfort.  Retention is appropriate.  Program 1, Volume 6   Left ear: Uses Resound behind-the-ear hearing but bimodal conditions were not assessed due to cerumen impaction in left ear.      Soundfield Thresholds: Mono was detecting sound in a normal to mild loss range with his CI.  Stable.      CNC Words Test:  The patient repeats 25 single syllable words, auditory only. The words are presented at 60 dB A (conversational level) delivered from a CD player.    Preoperative Performance:  Left ear aided: 0%  Right ear aided: 4%  Bilaterally aided: 16%    CI Performance:  3 months: 36%  6 months: 20%  1 year: 52%  2 years: 36% old program, 44% new program  2 1/2 years: 56%  3 years: 52%  4 years: 52% processor 1, 44% processor 2  6 1/2 years (last visit on 9/24/18): 32%  6 1/2+ years: 52% - significant improvement re: 9/24/18 scores  8 1/2+ years: 52%   12 years (today): 48% - stable      Bimodal Performance:   3 months: 36%  6 months: 40%  1 year: 64%  2 years: Did not test (DNT)  2 1/2 years: 44% (may be affected by inconsistent use of hearing aid)   3 years: DNT at patient request - fatigue  4 years: DNT at patient request - fatigue  6 1/2 years (last visit on 9/24/18): 68%  6 1/2+ years: Did not retest since 9/24/18 scores were good  and patient fatigued today   8 1/2+ years: 72%    12 years (today): Did not test due to cerumen impaction in left/hearing aid ear     [employer] Sentences Test:  The patient repeats 20 sentences, auditory only.  The sentences are presented at 60 dB A (conversational level) delivered from a CD player.     Preoperative Performance:  Left ear aided: 3%  Right ear aided: 8%  Bilaterally aided: 19%    CI Performance:   3 months: 40%  6 months: 53%  1 year: 64%  2 years: 61% old program, 53% new program  2 1/2 years: 64%  3 years: 60%  4 years: 60% processor 1, 47% processor 2  6 1/2 years (last visit on 18): 42%  6 1/2+ years: 58% - improvement re: 18 scores  8 1/2+ years: 59%   12 years (today): 53% - stable     Bimodal Performance:  3 months: 48%  6 months: 77%  1 year: 70%  2 years: DNT   2 1/2 years: 71%  3 years: DNT at patient request - fatigue  4 years: DNT at patient request - fatigue  6 1/2 years (last visit on 18): 53%  6 1/2+ years: DNT  8 1/2+ years: 73%  12 years (today): Did not test due to cerumen impaction in left/hearing aid ear     COMMENTS:   Dataloggin.1 hours of use per day  Impedances: Normal and stable on all electrodes.  Compliance was maintained.  Since a full reprogramming session was not needed, programming charges were modified.     SUMMARY AND RECOMMENDATIONS: Mr. Del Valle returns for right cochlear implant follow up and repeat speech perception testing after 12 years of use and after recent Ready to Wear upgrade to N8 processor.  Audibility and speech perception scores were stable with right CI.  Bimodal testing was not completed due to left cerumen impaction.  Cochlear implant impedances were stable and normal.      Patient will return to the clinic in 1-2 years for repeat right CI testing or sooner as needed for programming.     Patient will schedule a left ear cleaning with PCP or in ENT Clinic.       He will also continue to follow up with Dr. Valentine for any medical concerns  related to his ears or cochlear implants.      The patient expressed understanding and agreement with this plan.    Abdullahi Quintanilla, CCC-A, [facility], [place]  Licensed Audiologist  [place] #[id number]    Enclosure: audiogram

## 2024-03-20 ENCOUNTER — OFFICE VISIT (OUTPATIENT)
Dept: AUDIOLOGY | Facility: CLINIC | Age: 89
End: 2024-03-20
Payer: COMMERCIAL

## 2024-03-20 DIAGNOSIS — H90.3 SENSORINEURAL HEARING LOSS, BILATERAL: Primary | ICD-10-CM

## 2024-03-20 PROCEDURE — 92604 REPROGRAM COCHLEAR IMPLT 7/>: CPT | Mod: 52 | Performed by: AUDIOLOGIST-HEARING AID FITTER

## 2024-03-20 PROCEDURE — 92626 EVAL AUD FUNCJ 1ST HOUR: CPT | Mod: XU | Performed by: AUDIOLOGIST-HEARING AID FITTER

## 2025-01-05 ENCOUNTER — HEALTH MAINTENANCE LETTER (OUTPATIENT)
Age: 89
End: 2025-01-05